# Patient Record
Sex: FEMALE | Race: WHITE
[De-identification: names, ages, dates, MRNs, and addresses within clinical notes are randomized per-mention and may not be internally consistent; named-entity substitution may affect disease eponyms.]

---

## 2017-01-09 NOTE — MAMMOGRAPHY REPORT
UNILATERAL LEFT DIGITAL DIAGNOSTIC MAMMOGRAM TOMOSYNTHESIS AND TARGETED LEFT ULTRASOUND: 1/6/2017

CLINICAL HISTORY: Callback from screening mammogram for left asymmetry and left breast calcification
s.  The patient reports a family history of breast cancer in her grandmother and sister.  Her sister
 was in her 30s when diagnosed.  





TECHNIQUE:  Breast tomosynthesis in addition to standard 2D mammography was performed.  Left CC and 
MLO 2-D and tomosynthesis images and spot magnification left CC and ML views were obtained.



COMPARISON: Comparison is made to exam dated:  12/28/2016 mammogram - [facility]. 
  



BREAST COMPOSITION:  The tissue of the left breast is heterogeneously dense, which may obscure small
 masses.  



FINDINGS:  Spot compression views of the left breast demonstrate a possible obscured mass in the lef
t subareolar/9:00 breast.  Spot magnification views demonstrate a few scattered punctate benign-appe
aring calcifications in the left medial breast, without a suspicious cluster of calcifications seen.



Targeted ultrasound was performed of the left breast in the region of the possible mammographic mass
.  In the left breast at 6:00 periareolar region, there is an oval slightly hypoechoic parallel madhavi
d mass which measures 1.4 x 0.7 x 1.1 cm.  The margins are not completely circumscribed on the ultra
sound images.  This likely corresponds with the mammographic mass and is indeterminant.  Recommend u
ltrasound guided core needle biopsy for further evaluation.  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







IMPRESSION:  ACR BI-RADS CATEGORY 4: SUSPICIOUS, TARGETED ULTRASOUND ACR BI-RADS CATEGORY 4: SUSPICI
OUS 

Hypoechoic 1.4 cm mass in the left breast at 6:00 periareolar region on ultrasound, which is felt to
 correlate with the mammographic mass.  The mass is indeterminate and ultrasound guided core needle 
biopsy is recommended for further evaluation.  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



A phone call was made to the physician's office to confirm faxed results were received.  The patient
 has been verbally notified of the results.  She tentatively scheduled the biopsy before leaving the
 department. 





Approximately 10% of breast cancers are not detected with mammography. A negative mammographic repor
t should not delay biopsy if a clinically suggestive mass is present.



Marium Bellamy M.D.          

ah/:1/6/2017 08:29:15  



Imaging Technologist: Lisa JUDD(PARAMJIT)(M), [facility]

letter sent: Abnormal 4/5  

BI-RADS Code: ACR BI-RADS Category 4: Suspicious  Ultrasound BI-RADS: ACR BI-RADS Category 4: Suspic
ious

## 2017-01-11 NOTE — DISCHARGE INSTRUCTIONS
Discharge Instructions


Procedure


Procedure Date:


Jan 11, 2017.


Reason for visit:


Left Mass.





Discharge


Discharge Date:


Jan 11, 2017.


Discharge Diagnosis:


post left breast ultrasound guided core biopsy





Instructions


Activity Recommendations:  Additional Limitations (see below)


Return to School/Work:  no limitations


Recommended Home Diet:  No Limitations


Provider Instructions:





ACTIVITY RECOMMENDATIONS:





*  No lifting, pushing, pulling or exercising the affected side for three days.








RETURN TO SCHOOL/WORK:





*  You may return to work/school after the procedure, but do not perform any 

strenuous


   activities for 24 to 48 hours.








MEDICATIONS:





*  Tylenol (two 325 mg) every four to six hours if needed for mild pain (if not 

allergic to Tylenol).








DIET:





*  Resume previous diet.








SPECIAL CARE INSTRUCTIONS:





*  Keep biopsy site dry for 24 hours.  May shower after 24 hours, but do not 

soak (bathe)


   incision.





*  May remove Tegaderm (plastic patch) tomorrow AFTER showering.





*  Leave the steri-strips on for one week.  Allow the steri-strips to fall off 

by themselves.  


   If not off after one week, you may remove them.  You may place a Bandaid


   crosswise over the strips, if desired.





*  Apply ice 10 minutes on and 10 minutes off as needed.





*  Wear a bra at bedtime to sleep more comfortably for 2-3 days.





*  Your referring physician should have the results after approximately 5 to 7 

business days.





*  Call for unusual bleeding, fever, drainage, etc or if you have any questions 

call


    [phone] during normal business hours or after hours call Dr Ramsey, 

[phone].








FOLLOW UP VISIT:





Follow-up with Referring Physician as scheduled.





Allergies


Coded Allergies:  


     Mustard (Verified  Adverse Reaction, Intermediate, ABDOMINAL CRAMPS, 3/2/15

)


     Penicillins (Verified  Adverse Reaction, Intermediate, SEVERE NAUSEA AND 

VOMITTING, 3/2/15)


Mitchell Argueta Recommendations:


 


Call your doctor if:


*  Temperature above 101 degrees


*  Pain not relieved by pain medicine ordered


*  There is increased drainage or redness from any incision


*  You have any unanswered questions or concerns.





Your Doctors Instructions noted above were prepared by provider Ciara Ramsey.


Patient Signature Section:


 Patient Instructions Signature Page








Margie James 











Patient (or Guardian) Signature/Date:____________________________________ I 

have read and understand the instructions given to me by my caregivers.








Caregiver/RN/Doctor Signature/Date:____________________________________








The above-named patient and/or guardian has received patient instructions on 

this date.


























+  Original Patient Signature Page (only) stays with chart.  Please make copy 

for patient.

## 2017-01-13 NOTE — MAMMOGRAPHY REPORT
THIS REPORT HAS BEEN AMENDED.  

ULTRASOUND GUIDED BIOPSY LEFT BREAST: 1/11/2017

CLINICAL HISTORY: Indeterminate solid 1.4 cm mass in the 6:00 periareolar left breast.  Patient pres
ents for ultrasound-guided core needle biopsy.  



COMPARISON: Comparison is made to exams dated:  1/6/2017 ultrasound, 1/6/2017 mammogram, and 12/28/2
016 mammogram - [facility].   



PATIENT CONSENT: The procedure, risks and benefits were discussed with the patient and informed writ
ten consent was obtained. Specific risks to this procedure include: bleeding, infection, puncture of
 adjacent structure, nontarget biopsy, sampling error and medication reaction.



PROCEDURE DESCRIPTION: A time out was performed and the right breast was agreed as the site of biops
y. The skin was prepped and draped in the usual sterile fashion. The parallel hypoechoic solid mass 
in the 6:00 periareolar left breast was chosen as the target for biopsy. Subcutaneous and intraparen
chymal 1% buffered lidocaine was administered as local anesthesia. A skin incision was made.  Throug
h the incision, 5 samples were taken with a 14 gauge Achieve biopsy device. A metallic marker was pl
aced at the biopsy site. Hemostasis was achieved after manual compression. The patient tolerated the
 procedure well and there was no immediate complication.  The samples were sent to the pathology dep
artment in appropriately labeled container.



Postprocedure left CC and ML tomosynthesis images were obtained.  There is a new ribbon-shaped metal
lic biopsy marker outlining with the mammographic mass in question.  No significant postbiopsy hemat
santino is identified.





IMPRESSION: ULTRASOUND GUIDED BIOPSY

Status post ultrasound-guided core biopsy of a solid mass in the 6:00 periareolar left breast, with 
biopsy marker placed at the site.



The patient will receive notification of the biopsy results from her referring physician.





Ciara Ramsey M.D.  

ay/:1/11/2017 09:05:24  



Attending Technologist: Dr. Ciara Ramsey, [facility]

Imaging Technologist: Chi JUDD(R)(M), [facility]









AMENDMENT: 1/18/2017   Ciara Ramsey M.D. 

Pathology results from ultrasound guided core biopsy of a hypoechoic solid mass in the deep 6:00 lef
t breast yielded a benign fibroepithelial proliferation.  See comments.  Negative for DCIS and invas
timi carcinoma.  Sections revealed dense fibrous tissue with admixed benign appearing ductal structur
es.  The features are suggestive of a fibroadenoma though a well-defined border with surrounding tis
philip is not identified within the biopsies and the typical intracanalicular growth pattern is not see
n.



Given these pathology results and the benign mammographic appearance would recommend repeat left sowmya
mography and repeat targeted ultrasound to ensure stability in 6 months.



letter sent: Follow Up Recommended 3

## 2017-01-13 NOTE — MAMMOGRAPHY REPORT
UNILATERAL LEFT DIGITAL DIAGNOSTIC MAMMOGRAM TOMOSYNTHESIS: 1/11/2017

CLINICAL HISTORY: Indeterminate solid appearing mass in the 6:00 periareolar left breast.  Patient p
resents for ultrasound-guided core needle biopsy.  



Please refer to the report from left breast ultrasound guided core biopsy performed at the same time
 for full detail.



IMPRESSION:  POST PROCEDURE IMAGING FOR MARKER PLACEMENT

Please refer to the report from left breast ultrasound guided core biopsy performed at the same time
 for full detail.



Approximately 10% of breast cancers are not detected with mammography. A negative mammographic repor
t should not delay biopsy if a clinically suggestive mass is present.



Ciara Ramsey M.D.          

ay/:1/11/2017 08:47:54  



Imaging Technologist: Chi JUDD(R)(M), [facility]



BI-RADS Code: Post Procedure Imaging For Marker Placement

## 2017-07-12 NOTE — MAMMOGRAPHY REPORT
UNILATERAL LEFT DIGITAL DIAGNOSTIC MAMMOGRAM TOMOSYNTHESIS WITH CAD AND TARGETED LEFT ULTRASOUND: 7/1
2/2017

CLINICAL HISTORY: Status post ultrasound guided biopsy of a left 6:00 breast mass January 2017, which
 yielded a benign fibroepithelial proliferation, here for short interval follow-up.  The patient repo
rts no current complaints.  





TECHNIQUE:  Breast tomosynthesis in addition to standard 2D mammography was performed. Current study 
was also evaluated with a Computer Aided Detection (CAD) system.  Left CC and MLO 2-D and tomosynthes
is images were obtained.



COMPARISON: Comparison is made to exams dated:  1/11/2017 mammogram, 1/11/2017 ultrasound biopsy, 1/6
/2017 ultrasound, 1/6/2017 mammogram, and 12/28/2016 mammogram - [facility].   



BREAST COMPOSITION:  The tissue of the left breast is heterogeneously dense, which may obscure small 
masses.  



FINDINGS:  Again noted is an obscured mass in the left inferior breast at approximately 6 to 7:00, wi
th an associated biopsy marker clip within it.  The mass does not appear significantly changed mammog
raphically compared to the prior exam. The remainder of the left breast is stable compared to prior e
xams, without suspicious masses, calcifications, or areas of architectural distortion noted.



Targeted ultrasound was performed of the region of the previously biopsied mass.  In the left 6:00 pe
riareolar region, again noted is an oval hypoechoic mass.  The mass currently measures 1.2 x 0.7 x 1.
0 cm, not significantly changed compared to the January 2017 exam where the mass measured 1.4 x 0.7 x
 1.1 cm.  A biopsy marker clip is seen within the mass.  Given that the mass yielded benign pathology
 on biopsy and given the stability, the mass is considered benign and likely represents a fibroadenom
a.





IMPRESSION:  ACR BI-RADS CATEGORY 2: BENIGN, TARGETED ULTRASOUND ACR BI-RADS CATEGORY 2: BENIGN 

The hypoechoic 1.2 cm mass in the left 6:00 breast is stable mammographically and sonographically com
pared to the January 2017 exam.  Given that the mass yielded benign pathology on biopsy and given the
 stability, the mass is considered benign and likely represents a fibroadenoma.  There is no mammogra
phic or targeted sonographic evidence of malignancy. Return to annual mammogram screening schedule is
 recommended, due December 2017.  



The patient has been verbally notified of the results.  





Approximately 10% of breast cancers are not detected with mammography. A negative mammographic report
 should not delay biopsy if a clinically suggestive mass is present.



Marium Bellamy M.D.          

ah/:7/12/2017 10:20:51  



Imaging Technologist: Alivia JANSEN (R)), [facility]

letter sent: Normal 1/2  

BI-RADS Code: ACR BI-RADS Category 2: Benign  Ultrasound BI-RADS: ACR BI-RADS Category 2: Benign

## 2017-12-29 ENCOUNTER — HOSPITAL ENCOUNTER (OUTPATIENT)
Dept: HOSPITAL 45 - C.RAD1850 | Age: 37
Discharge: HOME | End: 2017-12-29
Attending: FAMILY MEDICINE
Payer: COMMERCIAL

## 2017-12-29 ENCOUNTER — HOSPITAL ENCOUNTER (OUTPATIENT)
Dept: HOSPITAL 45 - C.MAMM | Age: 37
Discharge: HOME | End: 2017-12-29
Attending: PHYSICIAN ASSISTANT
Payer: COMMERCIAL

## 2017-12-29 DIAGNOSIS — Z12.31: Primary | ICD-10-CM

## 2017-12-29 DIAGNOSIS — M54.12: Primary | ICD-10-CM

## 2017-12-29 DIAGNOSIS — M25.78: ICD-10-CM

## 2017-12-29 NOTE — DIAGNOSTIC IMAGING REPORT
C-SPINE ROUTINE 4 OR 5 VIEWS



CLINICAL HISTORY: Cervical radiculopathy. No recent trauma.    



COMPARISON STUDY:  MRI of the cervical spine October 24, 2011.



FINDINGS: Alignment of the cervical spine is anatomic with the exception of

straightening. There is minimal osteophytosis at C6-C7. Facet joints are intact.

There is mild bony neural foraminal narrowing of the right C6-C7 neural foramen.

There is no fracture.



IMPRESSION:  



1. No cervical spine fracture.



2. Mild osteophytosis at C6-C7 with mild bony neural foraminal narrowing of the

right C6-C7 neural foramen.









Electronically signed by:  Pacheco Winkler M.D.

12/29/2017 9:36 AM



Dictated Date/Time:  12/29/2017 9:33 AM

## 2018-01-02 NOTE — MAMMOGRAPHY REPORT
BILATERAL DIGITAL SCREENING MAMMOGRAM TOMOSYNTHESIS WITH CAD: 12/29/2017

CLINICAL HISTORY: Routine screening.  Patient has no complaints.  





TECHNIQUE:  Breast tomosynthesis in addition to standard 2D mammography was performed. Current study 
was also evaluated with a Computer Aided Detection (CAD) system.  



COMPARISON: Comparison is made to exams dated:  7/12/2017 ultrasound, 7/12/2017 mammogram, 1/11/2017 
mammogram, 1/11/2017 ultrasound biopsy, 1/6/2017 ultrasound, and 1/6/2017 mammogram - [facility].   



BREAST COMPOSITION:  The tissue of both breasts is heterogeneously dense, which may obscure small mas
ses.  



FINDINGS:  No suspicious masses, calcifications, or areas of architectural distortion are noted in ei
ther breast. There has been no significant interval change compared to prior exams.  A biopsy marker 
clip is again noted within the left central posterior breast from prior benign biopsy.



IMPRESSION:  ACR BI-RADS CATEGORY 2: BENIGN

There is no mammographic evidence of malignancy. A 1 year screening mammogram is recommended.  The pa
tient will receive written notification of the results.  





Approximately 10% of breast cancers are not detected with mammography. A negative mammographic report
 should not delay biopsy if a clinically suggestive mass is present.



Marium Bellamy M.D.          

/:12/29/2017 16:31:09  



Imaging Technologist: Lsia Diaz, [facility]

letter sent: Normal 1/2  

BI-RADS Code: ACR BI-RADS Category 2: Benign

## 2018-01-08 ENCOUNTER — HOSPITAL ENCOUNTER (OUTPATIENT)
Dept: HOSPITAL 45 - C.MRI | Age: 38
Discharge: HOME | End: 2018-01-08
Attending: FAMILY MEDICINE
Payer: COMMERCIAL

## 2018-01-08 DIAGNOSIS — M47.892: Primary | ICD-10-CM

## 2018-01-08 NOTE — DIAGNOSTIC IMAGING REPORT
MRI OF THE CERVICAL SPINE WITHOUT IV CONTRAST



CLINICAL HISTORY: Neck pain. Chronic upper extremity tingling and numbness.



COMPARISON STUDY: MRI of the cervical spine dated 10/24/2011.



TECHNIQUE: MRI of the cervical spine is performed utilizing various T1 and

T2-weighted sequences in the axial and sagittal planes. IV contrast was not

administered for this examination.



FINDINGS:



Cervical spine: Vertebral body height and alignment are maintained throughout

the cervical spine. There is mild straightening of the cervical lordosis. The

atlantodental articulation appears maintained. The spinous processes are intact

as imaged. Tiny anterior osteophytes are seen in the lower cervical region.



Intervertebral discs: Mild degenerative disc desiccation is seen. There is mild

loss of height at C6-C7.



Spinal cord: The cervical spinal cord is normal in morphology and signal

intensity.



C2-C3: Unremarkable.



C3-C4: There is minimal disc bulge eccentric to the left. The central canal and

neural foramina are patent.



C4-C5: Unremarkable.



C5-C6: A posterior disc osteophyte complex abuts the ventral cord. Predominantly

uncovertebral arthropathy causes mild to moderate left greater than right neural

foraminal stenosis.



C6-C7: A posterior disc osteophyte complex abuts the ventral cord. Uncovertebral

and facet arthropathy cause mild to moderate right greater than left neural

foramen stenosis.



C7-T1: Unremarkable.



Soft tissues: The prevertebral and paraspinous soft tissues are within normal

limits.



Brain parenchyma: Partially imaged brain parenchyma at the skull base is normal

in appearance.





IMPRESSION:



1. The cervical spinal cord is normal in morphology and signal intensity.



2. Mild cervical spondylosis as above, greatest at C5-C6 and C6-C7. This has

only modestly progressed from the 10/24/2011 examination.









Dictated:  1/8/2018 7:59 PM

Transcribed:  1/8/2018 8:31 PM

[email or web address]







Electronically signed by:  Agusto Granado M.D.

1/8/2018 8:41 PM



Dictated Date/Time:  1/8/2018 7:59 PM